# Patient Record
Sex: MALE | URBAN - METROPOLITAN AREA
[De-identification: names, ages, dates, MRNs, and addresses within clinical notes are randomized per-mention and may not be internally consistent; named-entity substitution may affect disease eponyms.]

---

## 2017-04-01 ENCOUNTER — TELEPHONE (OUTPATIENT)
Dept: NURSING | Facility: CLINIC | Age: 22
End: 2017-04-01

## 2017-04-01 NOTE — TELEPHONE ENCOUNTER
"Call Type: Triage Call    Presenting Problem: Patient is \"depressed\" and \"anxious\" and,  girlfriend states patient is mumbling and goes from low to high  behavior. Patient stated to girlfiriend that she can go on without  him. Triager spoke with patient and patient seems down and sluggish  with words. Triaged for suicide concerns/Disposition to see ED  immediately.  Triage Note:  Guideline Title: Suicide Concerns Or Depression (Pediatric)  Recommended Disposition: See Provider within 4 hours  Original Inclination: Wanted to speak with a nurse  Override Disposition:  Intended Action: See Dr/Make Appt  Physician Contacted: No  Patient sounds severely depressed ?  YES  Child sounds very sick or weak to the triager ? NO  Sounds like a life-threatening emergency to the triager ? NO  [1] Patient is threatening suicide AND [2] has a deadly weapon (e.g., firearm,  knife) ? NO  [1] Caller expresses suicidal thoughts AND [2] hangs up AND [3] triager unable to  complete triage ? NO  [1] Patient has attempted suicide AND [2] has major injuries or serious overdose ?  NO  [1] Patient has attempted suicide today AND [2] has minor injuries or overdose ? NO  [1] Patient is threatening suicide now AND [2] unwilling to come in or  combative(Caution: police may be needed) ? NO  [1] Patient is threatening suicide now AND [2] willing to come in ? NO  [1] Patient not threatening suicide now BUT [2] revealed a suicide plan (eg.  overdose, gunshot) ? NO  [1] Postpartum depression AND [2] NO suicidal thoughts ? NO  Homicidal behavior is the main concern ? NO  Patient is extremely upset (e.g., can't be calmed down) ? NO  Suicide attempt in progress now and can't be stopped ? NO  Physician Instructions:  Care Advice: CARE ADVICE given per Suicide Concerns or Depression  (Pediatric) guideline.  CALL BACK IF: * Situation becomes more serious  ALTERNATE DISPOSITION - PSYCH EVALUATION: * If patient has a private  psychiatrist, psychologist or " counselor, recommend the caller phone the  therapist now. * If not in counseling or can't reach therapist, tell  patient to go directly to appropriate ED.  REASSURANCE AND EDUCATION: * Depression is treatable. Although it seems bad  right now, you're only ____ years old (i.e., stay positive). I'm really  concerned for you (i.e., convey caring). Let me help you get the help you  need. * Note: if caller is initially resistant to coming in, encourage her  to talk about her troubles and ventilate her feelings.  SEE PHYSICIAN WITHIN 4 HOURS: * IF OFFICE WILL BE OPEN: Your child needs to  be seen within the next 3 or 4 hours. Call your doctor's office as soon as  it opens. * IF OFFICE WILL BE CLOSED: Your child needs to be seen within  the next 3 or 4 hours. A nearby Urgent Care Center is often a good source  of care. Another choice is to go to the ER. Go sooner if your child becomes  worse.  SUICIDE THREAT NEEDS EVALUATION: * All patients with a current suicide  attempt, threat (intent) or lethal plan should be seen immediately for an  evaluation even if they have no medical symptoms.  SUPERVISE YOUR TEEN: * If talking with parents, ask them to check the house  for anything he might use to hurt himself. Also make sure their child  doesn't have access to medications, alcohol or firearms. * Also request  they not leave him or her unsupervised or alone in the house. If older  teen, remove driving privilege and car keys.